# Patient Record
Sex: FEMALE | Race: WHITE | URBAN - METROPOLITAN AREA
[De-identification: names, ages, dates, MRNs, and addresses within clinical notes are randomized per-mention and may not be internally consistent; named-entity substitution may affect disease eponyms.]

---

## 2022-03-24 ENCOUNTER — WEB ENCOUNTER (OUTPATIENT)
Dept: URBAN - METROPOLITAN AREA CLINIC 98 | Facility: CLINIC | Age: 62
End: 2022-03-24

## 2022-03-29 ENCOUNTER — DASHBOARD ENCOUNTERS (OUTPATIENT)
Age: 62
End: 2022-03-29

## 2022-03-29 ENCOUNTER — OFFICE VISIT (OUTPATIENT)
Dept: URBAN - METROPOLITAN AREA CLINIC 98 | Facility: CLINIC | Age: 62
End: 2022-03-29
Payer: COMMERCIAL

## 2022-03-29 DIAGNOSIS — R19.7 DIARRHEA, UNSPECIFIED TYPE: ICD-10-CM

## 2022-03-29 DIAGNOSIS — K50.80 CROHN'S COLITIS: ICD-10-CM

## 2022-03-29 PROCEDURE — 99214 OFFICE O/P EST MOD 30 MIN: CPT | Performed by: INTERNAL MEDICINE

## 2022-03-29 RX ORDER — VENLAFAXINE HCL 37.5 MG
TAKE 1 CAPSULE (37.5 MG) BY ORAL ROUTE ONCE DAILY WITH FOOD CAPSULE, EXT RELEASE 24 HR ORAL 1
Qty: 0 | Refills: 0 | Status: ACTIVE | COMMUNITY
Start: 1900-01-01 | End: 1900-01-01

## 2022-03-29 RX ORDER — LETROZOLE TABLETS 2.5 MG/1
TAKE 1 TABLET (2.5 MG) BY ORAL ROUTE ONCE DAILY TABLET, FILM COATED ORAL 1
Qty: 0 | Refills: 0 | Status: ACTIVE | COMMUNITY
Start: 1900-01-01 | End: 1900-01-01

## 2022-03-29 RX ORDER — DEXTROAMPHETAMINE SACCHARATE, AMPHETAMINE ASPARTATE, DEXTROAMPHETAMINE SULFATE, AND AMPHETAMINE SULFATE 7.5; 7.5; 7.5; 7.5 MG/1; MG/1; MG/1; MG/1
TAKE 1 TABLET (30 MG) BY ORAL ROUTE ONCE DAILY BEFORE BREAKFAST TABLET ORAL 1
Qty: 0 | Refills: 0 | Status: ACTIVE | COMMUNITY
Start: 1900-01-01 | End: 1900-01-01

## 2022-03-29 NOTE — HPI-TODAY'S VISIT:
62 yo female with CD and Breast CA dx 2015.  Had diarrheal episode 2 weeks ago. Severe part lasted 1.5 days and then mild syx for another 1.5 days. Had abdominal pain, N/V.  Had eaten at [employer] sandwich: turkey  Over past year she has had more episodes of diarrhea and constipation. She is trying to avoid triggers. When she has diarrhea, she will have BRB maybe due to tears.  Illness 2 weeks ago reminded her of her cholecystectomy episode.  Other than those 3 days, over the past 3.5 years, she has been ok. When she eats, she has to go to the bathroom within an hour. Has difficulty walking 3 miles, has to go by 1.5 miles.  Mediterranean diet would be a good option, for mild to moderate CD. Better if she avoids dairy. Lactose is more of a trigger now, more than before. Feels her diet needs  Wonders about timing of colonoscopy.  Has diarrhea 5 time a week. BRB once a week. Occasional abd pain, Daily heartburn and indigestion.

## 2022-03-30 LAB
A/G RATIO: 1.7
ALBUMIN: 4.1
ALKALINE PHOSPHATASE: 115
ALT (SGPT): 12
AST (SGOT): 17
BASO (ABSOLUTE): 0.1
BASOS: 1
BILIRUBIN, TOTAL: <0.2
BUN/CREATININE RATIO: 33
BUN: 20
C-REACTIVE PROTEIN, QUANT: 11
CALCIUM: 9
CARBON DIOXIDE, TOTAL: 24
CHLORIDE: 101
CREATININE: 0.61
EGFR: 100
EOS (ABSOLUTE): 0.3
EOS: 5
FERRITIN, SERUM: 66
FOLATE (FOLIC ACID), SERUM: 6.7
GLOBULIN, TOTAL: 2.4
GLUCOSE: 94
HEMATOCRIT: 37.7
HEMATOLOGY COMMENTS:: (no result)
HEMOGLOBIN: 12.4
IMMATURE CELLS: (no result)
IMMATURE GRANS (ABS): 0
IMMATURE GRANULOCYTES: 0
IRON BIND.CAP.(TIBC): 309
IRON SATURATION: 29
IRON: 90
LYMPHS (ABSOLUTE): 1.3
LYMPHS: 18
MCH: 28.9
MCHC: 32.9
MCV: 88
MONOCYTES(ABSOLUTE): 0.7
MONOCYTES: 9
NEUTROPHILS (ABSOLUTE): 4.9
NEUTROPHILS: 67
NRBC: (no result)
PLATELETS: 349
POTASSIUM: 4.1
PROTEIN, TOTAL: 6.5
RBC: 4.29
RDW: 12.6
SEDIMENTATION RATE-WESTERGREN: 15
SODIUM: 138
UIBC: 219
VITAMIN B12: 422
VITAMIN D, 25-HYDROXY: 23.4
WBC: 7.3

## 2022-04-01 ENCOUNTER — TELEPHONE ENCOUNTER (OUTPATIENT)
Dept: URBAN - METROPOLITAN AREA CLINIC 98 | Facility: CLINIC | Age: 62
End: 2022-04-01

## 2022-04-01 RX ORDER — ERGOCALCIFEROL 1.25 MG/1
1 CAPSULE CAPSULE, LIQUID FILLED ORAL
Qty: 4 CAPSULES | Refills: 5 | OUTPATIENT
Start: 2022-04-01 | End: 2022-09-28

## 2022-04-06 ENCOUNTER — OFFICE VISIT (OUTPATIENT)
Dept: URBAN - METROPOLITAN AREA SURGERY CENTER 18 | Facility: SURGERY CENTER | Age: 62
End: 2022-04-06

## 2022-04-06 ENCOUNTER — TELEPHONE ENCOUNTER (OUTPATIENT)
Dept: URBAN - METROPOLITAN AREA CLINIC 98 | Facility: CLINIC | Age: 62
End: 2022-04-06

## 2022-04-06 RX ORDER — BUDESONIDE 9 MG/1
1 TABLET IN THE MORNING TABLET, EXTENDED RELEASE ORAL ONCE A DAY
Qty: 30 TABLETS | Refills: 1 | OUTPATIENT
Start: 2022-04-06 | End: 2022-06-05

## 2022-04-12 ENCOUNTER — LAB OUTSIDE AN ENCOUNTER (OUTPATIENT)
Dept: URBAN - METROPOLITAN AREA CLINIC 98 | Facility: CLINIC | Age: 62
End: 2022-04-12

## 2022-04-19 LAB
C DIFFICILE TOXIN GENE NAA: NEGATIVE
C DIFFICILE TOXINS A+B, EIA: NEGATIVE
CALPROTECTIN, FECAL: 525
CAMPYLOBACTER CULTURE: (no result)
E COLI SHIGA TOXIN EIA: NEGATIVE
LACTOFERRIN, FECAL, QUANT.: 3.12
Lab: (no result)
OVA + PARASITE EXAM: (no result)
SALMONELLA/SHIGELLA SCREEN: (no result)
STOOL CULTURE, YERSINIA ONLY: (no result)

## 2022-04-26 ENCOUNTER — TELEPHONE ENCOUNTER (OUTPATIENT)
Dept: URBAN - METROPOLITAN AREA CLINIC 98 | Facility: CLINIC | Age: 62
End: 2022-04-26

## 2022-04-29 ENCOUNTER — CLAIMS CREATED FROM THE CLAIM WINDOW (OUTPATIENT)
Dept: URBAN - METROPOLITAN AREA CLINIC 4 | Facility: CLINIC | Age: 62
End: 2022-04-29
Payer: COMMERCIAL

## 2022-04-29 ENCOUNTER — OFFICE VISIT (OUTPATIENT)
Dept: URBAN - METROPOLITAN AREA SURGERY CENTER 18 | Facility: SURGERY CENTER | Age: 62
End: 2022-04-29
Payer: COMMERCIAL

## 2022-04-29 ENCOUNTER — TELEPHONE ENCOUNTER (OUTPATIENT)
Dept: URBAN - METROPOLITAN AREA CLINIC 98 | Facility: CLINIC | Age: 62
End: 2022-04-29

## 2022-04-29 ENCOUNTER — TELEPHONE ENCOUNTER (OUTPATIENT)
Dept: URBAN - METROPOLITAN AREA CLINIC 92 | Facility: CLINIC | Age: 62
End: 2022-04-29

## 2022-04-29 DIAGNOSIS — K63.89 CYST OF DUODENUM: ICD-10-CM

## 2022-04-29 DIAGNOSIS — K31.A0 GASTRIC INTESTINAL METAPLASIA, UNSPECIFIED: ICD-10-CM

## 2022-04-29 DIAGNOSIS — K29.60 ADENOPAPILLOMATOSIS GASTRICA: ICD-10-CM

## 2022-04-29 DIAGNOSIS — K21.9 ACID REFLUX: ICD-10-CM

## 2022-04-29 DIAGNOSIS — K50.80 CROHN'S COLITIS: ICD-10-CM

## 2022-04-29 DIAGNOSIS — K31.89 FOCAL FOVEOLAR HYPERPLASIA: ICD-10-CM

## 2022-04-29 DIAGNOSIS — K21.9 GASTRO-ESOPHAGEAL REFLUX DISEASE WITHOUT ESOPHAGITIS: ICD-10-CM

## 2022-04-29 DIAGNOSIS — K29.81 DUODENITIS WITH BLEEDING: ICD-10-CM

## 2022-04-29 DIAGNOSIS — K29.80 ACUTE DUODENITIS: ICD-10-CM

## 2022-04-29 PROBLEM — 235595009 GASTROESOPHAGEAL REFLUX DISEASE: Status: ACTIVE | Noted: 2022-03-30

## 2022-04-29 PROBLEM — 71833008 CROHN'S DISEASE OF SMALL AND LARGE INTESTINES: Status: ACTIVE | Noted: 2022-03-30

## 2022-04-29 PROCEDURE — 88342 IMHCHEM/IMCYTCHM 1ST ANTB: CPT | Performed by: PATHOLOGY

## 2022-04-29 PROCEDURE — 45380 COLONOSCOPY AND BIOPSY: CPT | Performed by: INTERNAL MEDICINE

## 2022-04-29 PROCEDURE — 88305 TISSUE EXAM BY PATHOLOGIST: CPT | Performed by: PATHOLOGY

## 2022-04-29 PROCEDURE — 88312 SPECIAL STAINS GROUP 1: CPT | Performed by: PATHOLOGY

## 2022-04-29 PROCEDURE — 43239 EGD BIOPSY SINGLE/MULTIPLE: CPT | Performed by: INTERNAL MEDICINE

## 2022-04-29 PROCEDURE — G8907 PT DOC NO EVENTS ON DISCHARG: HCPCS | Performed by: INTERNAL MEDICINE

## 2022-04-29 RX ORDER — ERGOCALCIFEROL 1.25 MG/1
1 CAPSULE CAPSULE, LIQUID FILLED ORAL
Qty: 4 CAPSULES | Refills: 5 | Status: ACTIVE | COMMUNITY
Start: 2022-04-01 | End: 2022-09-28

## 2022-04-29 RX ORDER — OMEPRAZOLE 40 MG/1
1 CAPSULE 30 MINUTES BEFORE MORNING MEAL CAPSULE, DELAYED RELEASE ORAL ONCE A DAY
Qty: 90 CAPSULES | Refills: 3 | OUTPATIENT
Start: 2022-04-29

## 2022-04-29 RX ORDER — LETROZOLE TABLETS 2.5 MG/1
TAKE 1 TABLET (2.5 MG) BY ORAL ROUTE ONCE DAILY TABLET, FILM COATED ORAL 1
Qty: 0 | Refills: 0 | Status: ACTIVE | COMMUNITY
Start: 1900-01-01 | End: 1900-01-01

## 2022-04-29 RX ORDER — VENLAFAXINE HCL 37.5 MG
TAKE 1 CAPSULE (37.5 MG) BY ORAL ROUTE ONCE DAILY WITH FOOD CAPSULE, EXT RELEASE 24 HR ORAL 1
Qty: 0 | Refills: 0 | Status: ACTIVE | COMMUNITY
Start: 1900-01-01 | End: 1900-01-01

## 2022-04-29 RX ORDER — BUDESONIDE 9 MG/1
1 TABLET IN THE MORNING TABLET, EXTENDED RELEASE ORAL ONCE A DAY
Qty: 30 TABLETS | Refills: 1 | Status: ACTIVE | COMMUNITY
Start: 2022-04-06 | End: 2022-06-05

## 2022-04-29 RX ORDER — DEXTROAMPHETAMINE SACCHARATE, AMPHETAMINE ASPARTATE, DEXTROAMPHETAMINE SULFATE, AND AMPHETAMINE SULFATE 7.5; 7.5; 7.5; 7.5 MG/1; MG/1; MG/1; MG/1
TAKE 1 TABLET (30 MG) BY ORAL ROUTE ONCE DAILY BEFORE BREAKFAST TABLET ORAL 1
Qty: 0 | Refills: 0 | Status: ACTIVE | COMMUNITY
Start: 1900-01-01 | End: 1900-01-01

## 2022-05-12 ENCOUNTER — LAB OUTSIDE AN ENCOUNTER (OUTPATIENT)
Dept: URBAN - METROPOLITAN AREA CLINIC 98 | Facility: CLINIC | Age: 62
End: 2022-05-12

## 2022-05-12 ENCOUNTER — TELEPHONE ENCOUNTER (OUTPATIENT)
Dept: URBAN - METROPOLITAN AREA CLINIC 98 | Facility: CLINIC | Age: 62
End: 2022-05-12

## 2022-05-12 ENCOUNTER — TELEPHONE ENCOUNTER (OUTPATIENT)
Dept: URBAN - METROPOLITAN AREA CLINIC 92 | Facility: CLINIC | Age: 62
End: 2022-05-12

## 2022-05-12 LAB
CREATININE POC: 0.7
PERFORMING LAB: (no result)

## 2022-05-12 RX ORDER — METRONIDAZOLE 500 MG/1
1 TABLET TABLET ORAL THREE TIMES A DAY
Qty: 90 TABLET | Refills: 0 | OUTPATIENT
Start: 2022-05-12 | End: 2022-06-11

## 2022-11-27 ENCOUNTER — ERX REFILL RESPONSE (OUTPATIENT)
Dept: URBAN - METROPOLITAN AREA CLINIC 98 | Facility: CLINIC | Age: 62
End: 2022-11-27

## 2022-11-27 RX ORDER — ERGOCALCIFEROL CAPSULES, 1.25 MG/1
TAKE ONE CAPSULE BY MOUTH ONCE WEEKLY CAPSULE ORAL
Qty: 4 CAPSULE | Refills: 0 | OUTPATIENT

## 2023-01-12 ENCOUNTER — ERX REFILL RESPONSE (OUTPATIENT)
Dept: URBAN - METROPOLITAN AREA CLINIC 98 | Facility: CLINIC | Age: 63
End: 2023-01-12

## 2023-01-12 RX ORDER — ERGOCALCIFEROL CAPSULES, 1.25 MG/1
TAKE ONE CAPSULE BY MOUTH ONCE WEEKLY CAPSULE ORAL
Qty: 4 CAPSULE | Refills: 0 | OUTPATIENT

## 2023-02-08 ENCOUNTER — OFFICE VISIT (OUTPATIENT)
Dept: URBAN - METROPOLITAN AREA CLINIC 98 | Facility: CLINIC | Age: 63
End: 2023-02-08

## 2023-03-26 ENCOUNTER — ERX REFILL RESPONSE (OUTPATIENT)
Dept: URBAN - METROPOLITAN AREA CLINIC 98 | Facility: CLINIC | Age: 63
End: 2023-03-26

## 2023-03-26 RX ORDER — ERGOCALCIFEROL CAPSULES, 1.25 MG/1
TAKE ONE CAPSULE BY MOUTH ONCE WEEKLY CAPSULE ORAL
Qty: 4 CAPSULE | Refills: 0 | OUTPATIENT

## 2024-06-26 ENCOUNTER — OFFICE VISIT (OUTPATIENT)
Dept: URBAN - METROPOLITAN AREA CLINIC 98 | Facility: CLINIC | Age: 64
End: 2024-06-26

## 2024-07-22 ENCOUNTER — OFFICE VISIT (OUTPATIENT)
Dept: URBAN - METROPOLITAN AREA CLINIC 98 | Facility: CLINIC | Age: 64
End: 2024-07-22
Payer: COMMERCIAL

## 2024-07-22 ENCOUNTER — LAB OUTSIDE AN ENCOUNTER (OUTPATIENT)
Dept: URBAN - METROPOLITAN AREA CLINIC 98 | Facility: CLINIC | Age: 64
End: 2024-07-22

## 2024-07-22 ENCOUNTER — TELEPHONE ENCOUNTER (OUTPATIENT)
Dept: URBAN - METROPOLITAN AREA CLINIC 98 | Facility: CLINIC | Age: 64
End: 2024-07-22

## 2024-07-22 VITALS
HEIGHT: 66 IN | WEIGHT: 142.2 LBS | HEART RATE: 94 BPM | DIASTOLIC BLOOD PRESSURE: 72 MMHG | SYSTOLIC BLOOD PRESSURE: 124 MMHG | BODY MASS INDEX: 22.85 KG/M2 | TEMPERATURE: 98.1 F

## 2024-07-22 DIAGNOSIS — K21.9 GERD: ICD-10-CM

## 2024-07-22 DIAGNOSIS — K50.80 CROHN'S COLITIS: ICD-10-CM

## 2024-07-22 DIAGNOSIS — R19.7 DIARRHEA, UNSPECIFIED TYPE: ICD-10-CM

## 2024-07-22 PROCEDURE — 99215 OFFICE O/P EST HI 40 MIN: CPT | Performed by: INTERNAL MEDICINE

## 2024-07-22 RX ORDER — OMEPRAZOLE 40 MG/1
1 CAPSULE 30 MINUTES BEFORE MORNING MEAL CAPSULE, DELAYED RELEASE ORAL ONCE A DAY
Qty: 90 CAPSULES | Refills: 3 | Status: ACTIVE | COMMUNITY
Start: 2022-04-29

## 2024-07-22 RX ORDER — VENLAFAXINE HCL 37.5 MG
TAKE 1 CAPSULE (37.5 MG) BY ORAL ROUTE ONCE DAILY WITH FOOD CAPSULE, EXT RELEASE 24 HR ORAL 1
Qty: 0 | Refills: 0 | Status: ACTIVE | COMMUNITY
Start: 1900-01-01

## 2024-07-22 RX ORDER — OMEPRAZOLE 40 MG/1
1 CAPSULE 30 MINUTES BEFORE MORNING MEAL CAPSULE, DELAYED RELEASE ORAL ONCE A DAY
Qty: 90 CAPSULES | Refills: 3
Start: 2022-04-29

## 2024-07-22 RX ORDER — ERGOCALCIFEROL CAPSULES, 1.25 MG/1
TAKE ONE CAPSULE BY MOUTH ONCE WEEKLY CAPSULE ORAL
Qty: 4 CAPSULE | Refills: 0 | Status: ACTIVE | COMMUNITY

## 2024-07-22 RX ORDER — DEXTROAMPHETAMINE SACCHARATE, AMPHETAMINE ASPARTATE, DEXTROAMPHETAMINE SULFATE, AND AMPHETAMINE SULFATE 7.5; 7.5; 7.5; 7.5 MG/1; MG/1; MG/1; MG/1
TAKE 1 TABLET (30 MG) BY ORAL ROUTE ONCE DAILY BEFORE BREAKFAST TABLET ORAL 1
Qty: 0 | Refills: 0 | Status: ACTIVE | COMMUNITY
Start: 1900-01-01

## 2024-07-22 NOTE — PHYSICAL EXAM RECTAL:
normal tone, no external hemorrhoids, no masses palpable, no red blood, Tenderness on LAVONNE, Internal hemorrhoids present

## 2024-07-22 NOTE — HPI-TODAY'S VISIT:
65 yo female with CD dx Oct 2016 and h/o breast CA dx .  Never had hosp for Crohn's. CD sx have been modest. No resection, no hosp.  Discussed flagyl trial 2.5 years ago but syx not bad enough to proceed. Had calpro of 525, crp 11, vit d 23.  Sadly, mother passed away of sq cancer of skin and parotid and mets to lungs, and  2 weeks ago.  2 things going on at this time. She has an increase in reflux syx. Burning every time she eats. Has not had that in the past. When she did DNA testing for breast cancer. She has the "gene" associated with stomach cancer. She has a "hernia" seen at upper endoscopy. Is reflux related to hernia. Smaller meals are better. She is exercising and walking better on the trail. Stooling is 2-3 stools a day, usually nl. Occasional blood. No soilage and no leaking. Occasional adominal pain, less than once a week. Not food related. AGWS.  No current tx for CD.  \========================= 3/29/22  60 yo female with CD and Breast CA dx .  Had diarrheal episode 2 weeks ago. Severe part lasted 1.5 days and then mild syx for another 1.5 days. Had abdominal pain, N/V.  Had eaten at [employer] sandwich: turkey  Over past year she has had more episodes of diarrhea and constipation. She is trying to avoid triggers. When she has diarrhea, she will have BRB maybe due to tears.  Illness 2 weeks ago reminded her of her cholecystectomy episode.  Other than those 3 days, over the past 3.5 years, she has been ok. When she eats, she has to go to the bathroom within an hour. Has difficulty walking 3 miles, has to go by 1.5 miles.  Mediterranean diet would be a good option, for mild to moderate CD. Better if she avoids dairy. Lactose is more of a trigger now, more than before. Feels her diet needs  Wonders about timing of colonoscopy.  Has diarrhea 5 time a week. BRB once a week. Occasional abd pain, Daily heartburn and indigestion.

## 2024-07-25 LAB
ALBUMIN: 4.2
ALKALINE PHOSPHATASE: 100
ALT (SGPT): 15
AST (SGOT): 22
BASO (ABSOLUTE): 0
BASOS: 1
BILIRUBIN, TOTAL: 0.2
BUN/CREATININE RATIO: 26
BUN: 16
C DIFFICILE TOXIN GENE NAA: (no result)
C DIFFICILE TOXINS A+B, EIA: (no result)
C-REACTIVE PROTEIN, QUANT: 7
CALCIUM: 9.3
CALPROTECTIN, FECAL: (no result)
CAMPYLOBACTER CULTURE: (no result)
CARBON DIOXIDE, TOTAL: 26
CHLORIDE: 104
CREATININE: 0.61
E COLI SHIGA TOXIN EIA: (no result)
EGFR: 99
EOS (ABSOLUTE): 0.2
EOS: 3
FERRITIN, SERUM: 73
FOLATE (FOLIC ACID), SERUM: 16.5
GLOBULIN, TOTAL: 2.5
GLUCOSE: 104
HEMATOCRIT: 40.5
HEMATOLOGY COMMENTS:: (no result)
HEMOGLOBIN: 12.9
IMMATURE CELLS: (no result)
IMMATURE GRANS (ABS): 0
IMMATURE GRANULOCYTES: 0
IRON BIND.CAP.(TIBC): 332
IRON SATURATION: 33
IRON: 110
LACTOFERRIN, FECAL, QUANT.: (no result)
LYMPHS (ABSOLUTE): 1.4
LYMPHS: 21
MCH: 29.1
MCHC: 31.9
MCV: 91
MONOCYTES(ABSOLUTE): 0.6
MONOCYTES: 9
NEUTROPHILS (ABSOLUTE): 4.2
NEUTROPHILS: 66
NRBC: (no result)
PLATELETS: 325
POTASSIUM: 4.7
PROTEIN, TOTAL: 6.7
RBC: 4.44
RDW: 13.1
REQUEST PROBLEM: (no result)
REQUEST PROBLEM: (no result)
SALMONELLA/SHIGELLA SCREEN: (no result)
SEDIMENTATION RATE-WESTERGREN: 2
SODIUM: 142
STOOL CULTURE, YERSINIA ONLY: (no result)
UIBC: 222
VITAMIN B12: 417
VITAMIN D, 25-HYDROXY: 22.8
WBC: 6.4

## 2024-07-30 ENCOUNTER — TELEPHONE ENCOUNTER (OUTPATIENT)
Dept: URBAN - METROPOLITAN AREA CLINIC 98 | Facility: CLINIC | Age: 64
End: 2024-07-30

## 2024-07-30 RX ORDER — ERGOCALCIFEROL CAPSULES, 1.25 MG/1
TAKE ONE CAPSULE BY MOUTH ONCE WEEKLY CAPSULE ORAL
Qty: 12 CAPSULES | Refills: 3

## 2024-08-12 ENCOUNTER — LAB OUTSIDE AN ENCOUNTER (OUTPATIENT)
Dept: URBAN - METROPOLITAN AREA CLINIC 98 | Facility: CLINIC | Age: 64
End: 2024-08-12

## 2024-08-19 LAB
C DIFFICILE TOXIN GENE NAA: NEGATIVE
C DIFFICILE TOXINS A+B, EIA: NEGATIVE
CALPROTECTIN, FECAL: 255
CAMPYLOBACTER CULTURE: (no result)
E COLI SHIGA TOXIN EIA: NEGATIVE
LACTOFERRIN, FECAL, QUANT.: 4.72
Lab: (no result)
SALMONELLA/SHIGELLA SCREEN: (no result)
STOOL CULTURE, YERSINIA ONLY: (no result)

## 2024-09-10 ENCOUNTER — OFFICE VISIT (OUTPATIENT)
Dept: URBAN - METROPOLITAN AREA SURGERY CENTER 18 | Facility: SURGERY CENTER | Age: 64
End: 2024-09-10
Payer: COMMERCIAL

## 2024-09-10 ENCOUNTER — CLAIMS CREATED FROM THE CLAIM WINDOW (OUTPATIENT)
Dept: URBAN - METROPOLITAN AREA CLINIC 4 | Facility: CLINIC | Age: 64
End: 2024-09-10
Payer: COMMERCIAL

## 2024-09-10 ENCOUNTER — TELEPHONE ENCOUNTER (OUTPATIENT)
Dept: URBAN - METROPOLITAN AREA CLINIC 98 | Facility: CLINIC | Age: 64
End: 2024-09-10

## 2024-09-10 DIAGNOSIS — K31.89 OTHER DISEASES OF STOMACH AND DUODENUM: ICD-10-CM

## 2024-09-10 DIAGNOSIS — K63.89 OTHER SPECIFIED DISEASES OF INTESTINE: ICD-10-CM

## 2024-09-10 DIAGNOSIS — K50.90 CROHN'S DISEASE WITHOUT COMPLICATION, UNSPECIFIED GASTROINTESTINAL TRACT LOCATION: ICD-10-CM

## 2024-09-10 DIAGNOSIS — K22.10 ESOPHAGEAL ULCER: ICD-10-CM

## 2024-09-10 DIAGNOSIS — K90.1 SPRUE: ICD-10-CM

## 2024-09-10 DIAGNOSIS — K57.30 DIVERTICULOSIS OF LARGE INTESTINE WITHOUT PERFORATION OR ABSCESS WITHOUT BLEEDING: ICD-10-CM

## 2024-09-10 DIAGNOSIS — K22.2 SCHATZKI'S RING: ICD-10-CM

## 2024-09-10 DIAGNOSIS — K44.9 LARGE HIATAL HERNIA: ICD-10-CM

## 2024-09-10 DIAGNOSIS — K21.9 GASTRO-ESOPHAGEAL REFLUX DISEASE WITHOUT ESOPHAGITIS: ICD-10-CM

## 2024-09-10 DIAGNOSIS — K90.0 CELIAC DISEASE: ICD-10-CM

## 2024-09-10 DIAGNOSIS — K56.699 STENOSIS OF ILEUM: ICD-10-CM

## 2024-09-10 PROCEDURE — 00813 ANES UPR LWR GI NDSC PX: CPT | Performed by: NURSE ANESTHETIST, CERTIFIED REGISTERED

## 2024-09-10 PROCEDURE — 88305 TISSUE EXAM BY PATHOLOGIST: CPT | Performed by: PATHOLOGY

## 2024-09-10 PROCEDURE — 88342 IMHCHEM/IMCYTCHM 1ST ANTB: CPT | Performed by: PATHOLOGY

## 2024-09-10 PROCEDURE — 88313 SPECIAL STAINS GROUP 2: CPT | Performed by: PATHOLOGY

## 2024-09-10 PROCEDURE — 43239 EGD BIOPSY SINGLE/MULTIPLE: CPT | Performed by: INTERNAL MEDICINE

## 2024-09-10 PROCEDURE — 45380 COLONOSCOPY AND BIOPSY: CPT | Performed by: INTERNAL MEDICINE

## 2024-09-10 RX ORDER — VENLAFAXINE HCL 37.5 MG
TAKE 1 CAPSULE (37.5 MG) BY ORAL ROUTE ONCE DAILY WITH FOOD CAPSULE, EXT RELEASE 24 HR ORAL 1
Qty: 0 | Refills: 0 | Status: ACTIVE | COMMUNITY
Start: 1900-01-01

## 2024-09-10 RX ORDER — OMEPRAZOLE 40 MG/1
1 CAPSULE 30 MINUTES BEFORE MORNING MEAL CAPSULE, DELAYED RELEASE ORAL ONCE A DAY
Qty: 90 CAPSULES | Refills: 3 | Status: ACTIVE | COMMUNITY
Start: 2022-04-29

## 2024-09-10 RX ORDER — OMEPRAZOLE 40 MG/1
1 CAPSULE CAPSULE, DELAYED RELEASE ORAL TWICE DAILY
Qty: 180 CAPSULES | Refills: 0
Start: 2022-04-29

## 2024-09-10 RX ORDER — DEXTROAMPHETAMINE SACCHARATE, AMPHETAMINE ASPARTATE, DEXTROAMPHETAMINE SULFATE, AND AMPHETAMINE SULFATE 7.5; 7.5; 7.5; 7.5 MG/1; MG/1; MG/1; MG/1
TAKE 1 TABLET (30 MG) BY ORAL ROUTE ONCE DAILY BEFORE BREAKFAST TABLET ORAL 1
Qty: 0 | Refills: 0 | Status: ACTIVE | COMMUNITY
Start: 1900-01-01

## 2024-09-10 RX ORDER — ERGOCALCIFEROL CAPSULES, 1.25 MG/1
TAKE ONE CAPSULE BY MOUTH ONCE WEEKLY CAPSULE ORAL
Qty: 12 CAPSULES | Refills: 3 | Status: ACTIVE | COMMUNITY

## 2024-11-04 ENCOUNTER — LAB OUTSIDE AN ENCOUNTER (OUTPATIENT)
Dept: URBAN - METROPOLITAN AREA CLINIC 98 | Facility: CLINIC | Age: 64
End: 2024-11-04

## 2024-11-04 ENCOUNTER — OFFICE VISIT (OUTPATIENT)
Dept: URBAN - METROPOLITAN AREA CLINIC 98 | Facility: CLINIC | Age: 64
End: 2024-11-04
Payer: COMMERCIAL

## 2024-11-04 VITALS
WEIGHT: 143.2 LBS | HEIGHT: 66 IN | SYSTOLIC BLOOD PRESSURE: 132 MMHG | HEART RATE: 93 BPM | TEMPERATURE: 97.3 F | DIASTOLIC BLOOD PRESSURE: 76 MMHG | BODY MASS INDEX: 23.01 KG/M2

## 2024-11-04 DIAGNOSIS — K50.80 CROHN'S COLITIS: ICD-10-CM

## 2024-11-04 DIAGNOSIS — M81.0 OSTEOPOROSIS WITHOUT CURRENT PATHOLOGICAL FRACTURE, UNSPECIFIED OSTEOPOROSIS TYPE: ICD-10-CM

## 2024-11-04 DIAGNOSIS — K21.9 GERD: ICD-10-CM

## 2024-11-04 DIAGNOSIS — K90.0 CELIAC DISEASE: ICD-10-CM

## 2024-11-04 PROBLEM — 235595009: Status: ACTIVE | Noted: 2024-11-04

## 2024-11-04 PROBLEM — 64859006: Status: ACTIVE | Noted: 2024-11-04

## 2024-11-04 PROBLEM — 396331005: Status: ACTIVE | Noted: 2024-11-04

## 2024-11-04 PROCEDURE — 99215 OFFICE O/P EST HI 40 MIN: CPT | Performed by: INTERNAL MEDICINE

## 2024-11-04 RX ORDER — DEXTROAMPHETAMINE SACCHARATE, AMPHETAMINE ASPARTATE, DEXTROAMPHETAMINE SULFATE, AND AMPHETAMINE SULFATE 7.5; 7.5; 7.5; 7.5 MG/1; MG/1; MG/1; MG/1
TAKE 1 TABLET (30 MG) BY ORAL ROUTE ONCE DAILY BEFORE BREAKFAST TABLET ORAL 1
Qty: 0 | Refills: 0 | Status: ACTIVE | COMMUNITY
Start: 1900-01-01

## 2024-11-04 RX ORDER — OMEPRAZOLE 40 MG/1
1 CAPSULE CAPSULE, DELAYED RELEASE ORAL TWICE DAILY
Qty: 180 CAPSULES | Refills: 0 | Status: ACTIVE | COMMUNITY
Start: 2022-04-29

## 2024-11-04 RX ORDER — ERGOCALCIFEROL CAPSULES, 1.25 MG/1
TAKE ONE CAPSULE BY MOUTH ONCE WEEKLY CAPSULE ORAL
Qty: 12 CAPSULES | Refills: 3 | Status: ACTIVE | COMMUNITY

## 2024-11-04 RX ORDER — VENLAFAXINE HCL 37.5 MG
TAKE 1 CAPSULE (37.5 MG) BY ORAL ROUTE ONCE DAILY WITH FOOD CAPSULE, EXT RELEASE 24 HR ORAL 1
Qty: 0 | Refills: 0 | Status: ACTIVE | COMMUNITY
Start: 1900-01-01

## 2024-11-04 NOTE — HPI-TODAY'S VISIT:
63 yo female with h/o Crohns and breast   comes in to discuss syx and recent EGD findings of celiac disease.  EGD performed 9/10 showed large HH, Schatski ring and nl duodenum, though biopsies are c/w Crohn's disease. Her bx were cw celiac disease and she has cut out gluten in her diet.  Want to do celiac serology. Has cut out gluten the last 2 weeks.  Re Crohn's, had "stricture" at ICV at colon and could not enter ileum and want to check/confirm with MRE.  Reflux syx are better since increasing omeprazole to 40 mg q am, from 20 mg, and hesitant to use more.  Bone density shows extreme osteoporosis.  Will get monthly injections for osteo.   ============================= 24  63 yo female with CD dx Oct 2016 and h/o breast CA dx .  Never had hosp for Crohn's. CD sx have been modest. No resection, no hosp.  Discussed flagyl trial 2.5 years ago but syx not bad enough to proceed. Had calpro of 525, crp 11, vit d 23.  Sadly, mother passed away of sq cancer of skin and parotid and mets to lungs, and  2 weeks ago.  2 things going on at this time. She has an increase in reflux syx. Burning every time she eats. Has not had that in the past. When she did DNA testing for breast cancer. She has the "gene" associated with stomach cancer. She has a "hernia" seen at upper endoscopy. Is reflux related to hernia. Smaller meals are better. She is exercising and walking better on the trail. Stooling is 2-3 stools a day, usually nl. Occasional blood. No soilage and no leaking. Occasional adominal pain, less than once a week. Not food related. AGWS.  No current tx for CD.  \========================= 3/29/22  62 yo female with CD and Breast CA dx .  Had diarrheal episode 2 weeks ago. Severe part lasted 1.5 days and then mild syx for another 1.5 days. Had abdominal pain, N/V.  Had eaten at [employer] sandwich: turkey  Over past year she has had more episodes of diarrhea and constipation. She is trying to avoid triggers. When she has diarrhea, she will have BRB maybe due to tears.  Illness 2 weeks ago reminded her of her cholecystectomy episode.  Other than those 3 days, over the past 3.5 years, she has been ok. When she eats, she has to go to the bathroom within an hour. Has difficulty walking 3 miles, has to go by 1.5 miles.  Mediterranean diet would be a good option, for mild to moderate CD. Better if she avoids dairy. Lactose is more of a trigger now, more than before. Feels her diet needs  Wonders about timing of colonoscopy.  Has diarrhea 5 time a week. BRB once a week. Occasional abd pain, Daily heartburn and indigestion.

## 2024-11-23 ENCOUNTER — LAB OUTSIDE AN ENCOUNTER (OUTPATIENT)
Dept: URBAN - METROPOLITAN AREA CLINIC 98 | Facility: CLINIC | Age: 64
End: 2024-11-23

## 2025-01-13 ENCOUNTER — LAB OUTSIDE AN ENCOUNTER (OUTPATIENT)
Dept: URBAN - METROPOLITAN AREA CLINIC 98 | Facility: CLINIC | Age: 65
End: 2025-01-13

## 2025-01-13 ENCOUNTER — OFFICE VISIT (OUTPATIENT)
Dept: URBAN - METROPOLITAN AREA CLINIC 98 | Facility: CLINIC | Age: 65
End: 2025-01-13
Payer: COMMERCIAL

## 2025-01-13 VITALS
BODY MASS INDEX: 22.76 KG/M2 | WEIGHT: 141.6 LBS | HEART RATE: 88 BPM | TEMPERATURE: 97.4 F | HEIGHT: 66 IN | DIASTOLIC BLOOD PRESSURE: 71 MMHG | SYSTOLIC BLOOD PRESSURE: 105 MMHG

## 2025-01-13 DIAGNOSIS — M81.0 OSTEOPOROSIS WITHOUT CURRENT PATHOLOGICAL FRACTURE, UNSPECIFIED OSTEOPOROSIS TYPE: ICD-10-CM

## 2025-01-13 DIAGNOSIS — K90.0 CELIAC DISEASE: ICD-10-CM

## 2025-01-13 DIAGNOSIS — K50.80 CROHN'S COLITIS: ICD-10-CM

## 2025-01-13 DIAGNOSIS — K21.9 GERD: ICD-10-CM

## 2025-01-13 PROCEDURE — 99215 OFFICE O/P EST HI 40 MIN: CPT | Performed by: INTERNAL MEDICINE

## 2025-01-13 RX ORDER — DEXTROAMPHETAMINE SACCHARATE, AMPHETAMINE ASPARTATE, DEXTROAMPHETAMINE SULFATE, AND AMPHETAMINE SULFATE 7.5; 7.5; 7.5; 7.5 MG/1; MG/1; MG/1; MG/1
TAKE 1 TABLET (30 MG) BY ORAL ROUTE ONCE DAILY BEFORE BREAKFAST TABLET ORAL 1
Qty: 0 | Refills: 0 | Status: ACTIVE | COMMUNITY
Start: 1900-01-01

## 2025-01-13 RX ORDER — ERGOCALCIFEROL CAPSULES, 1.25 MG/1
TAKE ONE CAPSULE BY MOUTH ONCE WEEKLY CAPSULE ORAL
Qty: 12 CAPSULES | Refills: 3 | Status: ACTIVE | COMMUNITY

## 2025-01-13 RX ORDER — VENLAFAXINE HCL 37.5 MG
TAKE 1 CAPSULE (37.5 MG) BY ORAL ROUTE ONCE DAILY WITH FOOD CAPSULE, EXT RELEASE 24 HR ORAL 1
Qty: 0 | Refills: 0 | Status: ACTIVE | COMMUNITY
Start: 1900-01-01

## 2025-01-13 RX ORDER — OMEPRAZOLE 40 MG/1
1 CAPSULE CAPSULE, DELAYED RELEASE ORAL TWICE DAILY
Qty: 180 CAPSULES | Refills: 0 | Status: ACTIVE | COMMUNITY
Start: 2022-04-29

## 2025-01-13 NOTE — HPI-TODAY'S VISIT:
63 yo female Breast cancer  and dayana mastectomy and letrozole for 5 years - Dr. José and now Dr. Aden CD dx  No imm. No surg. No hospt. Has ICV stenosis at colonoscopy 9/10/24 and all nl colon bx. 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  She has Term Ileal CD [id number] activity Low grade sy of Celiac disease and feels better on a gluten free diet. Has followed a strict gluten free diet for the past 6 weeks and she will follow it for 2 more months and repeat egd.  For CD, because of breast cance dx  and mild syx, has never used immunosuppression. She will review with Dr. Aden.  No progression with 2 MRE    ================================== 24  63 yo female with h/o Crohns and breast   comes in to discuss syx and recent EGD findings of celiac disease.  EGD performed 9/10 showed large HH, Schatski ring and nl duodenum, though biopsies are c/w Crohn's disease. Her bx were cw celiac disease and she has cut out gluten in her diet.  Want to do celiac serology. Has cut out gluten the last 2 weeks.  Re Crohn's, had "stricture" at ICV at colon and could not enter ileum and want to check/confirm with MRE.  Reflux syx are better since increasing omeprazole to 40 mg q am, from 20 mg, and hesitant to use more.  Bone density shows extreme osteoporosis.  Will get monthly injections for osteo.   ============================= 24  63 yo female with CD dx Oct 2016 and h/o breast CA dx .  Never had hosp for Crohn's. CD sx have been modest. No resection, no hosp.  Discussed flagyl trial 2.5 years ago but syx not bad enough to proceed. Had calpro of 525, crp 11, vit d 23.  Sadly, mother passed away of sq cancer of skin and parotid and mets to lungs, and  2 weeks ago.  2 things going on at this time. She has an increase in reflux syx. Burning every time she eats. Has not had that in the past. When she did DNA testing for breast cancer. She has the "gene" associated with stomach cancer. She has a "hernia" seen at upper endoscopy. Is reflux related to hernia. Smaller meals are better. She is exercising and walking better on the trail. Stooling is 2-3 stools a day, usually nl. Occasional blood. No soilage and no leaking. Occasional adominal pain, less than once a week. Not food related. AGWS.  No current tx for CD.  \========================= 3/29/22  60 yo female with CD and Breast CA dx .  Had diarrheal episode 2 weeks ago. Severe part lasted 1.5 days and then mild syx for another 1.5 days. Had abdominal pain, N/V.  Had eaten at [employer] sandwich: turkey  Over past year she has had more episodes of diarrhea and constipation. She is trying to avoid triggers. When she has diarrhea, she will have BRB maybe due to tears.  Illness 2 weeks ago reminded her of her cholecystectomy episode.  Other than those 3 days, over the past 3.5 years, she has been ok. When she eats, she has to go to the bathroom within an hour. Has difficulty walking 3 miles, has to go by 1.5 miles.  Mediterranean diet would be a good option, for mild to moderate CD. Better if she avoids dairy. Lactose is more of a trigger now, more than before. Feels her diet needs  Wonders about timing of colonoscopy.  Has diarrhea 5 time a week. BRB once a week. Occasional abd pain, Daily heartburn and indigestion.

## 2025-01-14 LAB
ALBUMIN: 4.1
ALKALINE PHOSPHATASE: 152
ALT (SGPT): 12
AST (SGOT): 19
BASO (ABSOLUTE): 0.1
BASOS: 1
BILIRUBIN, TOTAL: 0.3
BUN/CREATININE RATIO: 30
BUN: 21
C-REACTIVE PROTEIN, QUANT: 8
CALCIUM: 9.1
CARBON DIOXIDE, TOTAL: 24
CHLORIDE: 105
CREATININE: 0.7
EGFR: 95
EOS (ABSOLUTE): 0.3
EOS: 4
FERRITIN, SERUM: 83
FOLATE (FOLIC ACID), SERUM: 6.2
GLOBULIN, TOTAL: 2.6
GLUCOSE: 100
HEMATOCRIT: 42.6
HEMATOLOGY COMMENTS:: (no result)
HEMOGLOBIN: 13.4
IMMATURE CELLS: (no result)
IMMATURE GRANS (ABS): 0
IMMATURE GRANULOCYTES: 0
IRON BIND.CAP.(TIBC): 349
IRON SATURATION: 31
IRON: 109
LYMPHS (ABSOLUTE): 1.3
LYMPHS: 17
MCH: 28.5
MCHC: 31.5
MCV: 90
MONOCYTES(ABSOLUTE): 0.6
MONOCYTES: 8
NEUTROPHILS (ABSOLUTE): 5.4
NEUTROPHILS: 70
NRBC: (no result)
PLATELETS: 362
POTASSIUM: 4.6
PROTEIN, TOTAL: 6.7
RBC: 4.71
RDW: 12.2
SEDIMENTATION RATE-WESTERGREN: 4
SODIUM: 140
UIBC: 240
VITAMIN B12: 370
VITAMIN D, 25-HYDROXY: 43
WBC: 7.7